# Patient Record
Sex: MALE | Race: WHITE | URBAN - METROPOLITAN AREA
[De-identification: names, ages, dates, MRNs, and addresses within clinical notes are randomized per-mention and may not be internally consistent; named-entity substitution may affect disease eponyms.]

---

## 2018-08-19 ENCOUNTER — RADIANT APPOINTMENT (OUTPATIENT)
Dept: GENERAL RADIOLOGY | Facility: CLINIC | Age: 38
End: 2018-08-19
Attending: PHYSICIAN ASSISTANT
Payer: COMMERCIAL

## 2018-08-19 ENCOUNTER — OFFICE VISIT (OUTPATIENT)
Dept: URGENT CARE | Facility: URGENT CARE | Age: 38
End: 2018-08-19
Payer: COMMERCIAL

## 2018-08-19 VITALS
DIASTOLIC BLOOD PRESSURE: 68 MMHG | WEIGHT: 192 LBS | TEMPERATURE: 96.1 F | SYSTOLIC BLOOD PRESSURE: 118 MMHG | RESPIRATION RATE: 18 BRPM

## 2018-08-19 DIAGNOSIS — S62.339A CLOSED BOXER'S FRACTURE, INITIAL ENCOUNTER: Primary | ICD-10-CM

## 2018-08-19 DIAGNOSIS — S69.91XA HAND INJURY, RIGHT, INITIAL ENCOUNTER: ICD-10-CM

## 2018-08-19 PROCEDURE — 99203 OFFICE O/P NEW LOW 30 MIN: CPT | Performed by: PHYSICIAN ASSISTANT

## 2018-08-19 PROCEDURE — 73130 X-RAY EXAM OF HAND: CPT | Mod: RT

## 2018-08-19 RX ORDER — HYDROCODONE BITARTRATE AND ACETAMINOPHEN 5; 325 MG/1; MG/1
1 TABLET ORAL EVERY 6 HOURS PRN
Qty: 20 TABLET | Refills: 0 | Status: SHIPPED | OUTPATIENT
Start: 2018-08-19

## 2018-08-19 ASSESSMENT — ENCOUNTER SYMPTOMS
SORE THROAT: 0
PALPITATIONS: 0
UNEXPECTED WEIGHT CHANGE: 0
COUGH: 0
ABDOMINAL PAIN: 0
SINUS PAIN: 0
NAUSEA: 0
FEVER: 0
WHEEZING: 0
FATIGUE: 0
DIARRHEA: 0
CHILLS: 0
EYE DISCHARGE: 0
SINUS PRESSURE: 0
EYE ITCHING: 0
MYALGIAS: 0
EYE REDNESS: 0
VOMITING: 0
EYE PAIN: 0
CONSTIPATION: 0
SHORTNESS OF BREATH: 0
RHINORRHEA: 0
ARTHRALGIAS: 0

## 2018-08-19 ASSESSMENT — PAIN SCALES - GENERAL: PAINLEVEL: MODERATE PAIN (5)

## 2018-08-19 NOTE — MR AVS SNAPSHOT
After Visit Summary   8/19/2018    Niko Rai    MRN: [id number]           Patient Information     Date Of Birth          1980        Visit Information        Provider Department      8/19/2018 12:20 PM Lindy Melchor PA-C [facility]        Today's Diagnoses     Hand injury, right, initial encounter    -  1       Follow-ups after your visit        Additional Services     ORTHOPEDICS ADULT REFERRAL       Your provider has referred you to: [facility] [place] [phone]   [email or web address]    Please be aware that coverage of these services is subject to the terms and limitations of your health insurance plan.  Call member services at your health plan with any benefit or coverage questions.      Please bring the following to your appointment:    >>   Any x-rays, CTs or MRIs which have been performed.  Contact the facility where they were done to arrange for  prior to your scheduled appointment.    >>   List of current medications   >>   This referral request   >>   Any documents/labs given to you for this referral                  Who to contact     If you have questions or need follow up information about today's clinic visit or your schedule please contact [facility] directly at [phone].  Normal or non-critical lab and imaging results will be communicated to you by MyChart, letter or phone within 4 business days after the clinic has received the results. If you do not hear from us within 7 days, please contact the clinic through MyChart or phone. If you have a critical or abnormal lab result, we will notify you by phone as soon as possible.  Submit refill requests through [employer] or call your pharmacy and they will forward the refill request to us. Please allow 3 business days for your refill to be completed.          Additional Information About Your  Visit        Care EveryWhere ID     This is your Care EveryWhere ID. This could be used by other organizations to access your [place] medical records  [id number]        Your Vitals Were     Temperature Respirations                96.1  F (35.6  C) (Tympanic) 18           Blood Pressure from Last 3 Encounters:   08/19/18 118/68    Weight from Last 3 Encounters:   08/19/18 192 lb (87.1 kg)              We Performed the Following     ORTHOPEDICS ADULT REFERRAL        Primary Care Provider Fax #    Physician No Ref-Primary [phone]       No address on file        Equal Access to Services     [facility]: Hadii aad ku hadasho Sonamita, waaxda luqadaha, qaybta kaalmada adeegyada, davida munoz . So [facility] [phone].    ATENCIÓN: Si habla español, tiene a gonzalez disposición servicios gratuitos de asistencia lingüística. Llame al [phone].    We comply with applicable federal civil rights laws and [place] laws. We do not discriminate on the basis of race, color, national origin, age, disability, sex, sexual orientation, or gender identity.            Thank you!     Thank you for choosing [facility]  for your care. Our goal is always to provide you with excellent care. Hearing back from our patients is one way we can continue to improve our services. Please take a few minutes to complete the written survey that you may receive in the mail after your visit with us. Thank you!             Your Updated Medication List - Protect others around you: Learn how to safely use, store and throw away your medicines at www.disposemymeds.org.      Notice  As of 8/19/2018  2:01 PM    You have not been prescribed any medications.

## 2018-08-19 NOTE — PROGRESS NOTES
SUBJECTIVE:   Niko Rai is a 38 year old male presenting with a chief complaint of   Chief Complaint   Patient presents with     Musculoskeletal Problem     Early saturday morning.  Right hand pain.  Swelling,  Punched door.        He is a new patient of [facility].    MS Injury/Pain    Onset of symptoms was 1 day(s) ago.  Location: right hand  Context:       The injury happened while at home      Mechanism: punched door      Patient experienced immediate pain, immediate swelling  Course of symptoms is same.    Severity moderate  Current and Associated symptoms: Pain and Swelling  Denies  Warmth and Redness  Aggravating Factors: movement, repetitive motion and flexion/extension  Therapies to improve symptoms include: ice  This is the first time this type of problem has occurred for this patient.     Patient is generally healthy with no significant past medical history, surgical history, or family history.      Review of Systems   Constitutional: Negative for chills, fatigue, fever and unexpected weight change.   HENT: Negative for congestion, ear pain, rhinorrhea, sinus pain, sinus pressure and sore throat.    Eyes: Negative for pain, discharge, redness and itching.   Respiratory: Negative for cough, shortness of breath and wheezing.    Cardiovascular: Negative for chest pain, palpitations and leg swelling.   Gastrointestinal: Negative for abdominal pain, constipation, diarrhea, nausea and vomiting.   Musculoskeletal: Negative for arthralgias and myalgias.        Right hand pain   Skin: Negative for rash.       History reviewed. No pertinent past medical history.  History reviewed. No pertinent family history.  Current Outpatient Prescriptions   Medication Sig Dispense Refill     HYDROcodone-acetaminophen (NORCO) 5-325 MG per tablet Take 1 tablet by mouth every 6 hours as needed for severe pain 20 tablet 0     Social History   Substance Use Topics     Smoking status: Never Smoker     Smokeless tobacco: Current  User     Types: Chew     Alcohol use Not on file       OBJECTIVE  /68 (BP Location: Right arm, Cuff Size: Adult Regular)  Temp 96.1  F (35.6  C) (Tympanic)  Resp 18  Wt 192 lb (87.1 kg)    Physical Exam   Constitutional: He appears well-developed and well-nourished. No distress.   Musculoskeletal:   Right hand has swelling and tenderness with palpation around 5th metacarpal. Painful active ROM. Good capillary refill. Sensation intact.    Skin: Skin is warm and dry.   Psychiatric: He has a normal mood and affect. His speech is normal and behavior is normal.       Labs:  No results found for this or any previous visit (from the past 24 hour(s)).    X-Ray was done, my findings are: boxer's fracture     ASSESSMENT:      ICD-10-CM    1. Closed boxer's fracture, initial encounter S62.339A ORTHOPEDICS ADULT REFERRAL     HYDROcodone-acetaminophen (NORCO) 5-325 MG per tablet   2. Hand injury, right, initial encounter S69.91XA XR Hand Right G/E 3 Views        Medical Decision Making:    Differential Diagnosis:  MS Injury Pain: sprain, fracture, muscle strain and contusion    Serious Comorbid Conditions:  Adult:  None    PLAN:    MS Injury/Pain  ice, elevate, rest, splint: ulnar gutter, Tylenol, Ibuprofen and Rx: Norco #20 as needed. Follow up with orhto    Followup:    Schedule appointment with orthopedics     There are no Patient Instructions on file for this visit.

## 2018-08-19 NOTE — NURSING NOTE
Chief Complaint   Patient presents with     Musculoskeletal Problem     Early saturday morning.  Right hand pain.  Swelling, slight numbness in fingers.  Punched door.        Initial /68 (BP Location: Right arm, Cuff Size: Adult Regular)  Temp 96.1  F (35.6  C) (Tympanic)  Resp 18  Wt 192 lb (87.1 kg) There is no height or weight on file to calculate BMI.      Health Maintenance that is potentially due pending provider review:  NONE    n/a    Is there anyone who you would like to be able to receive your results? Not Applicable  If yes have patient fill out CATE  Jerome Lozoya M.A.